# Patient Record
Sex: MALE | Race: WHITE | URBAN - METROPOLITAN AREA
[De-identification: names, ages, dates, MRNs, and addresses within clinical notes are randomized per-mention and may not be internally consistent; named-entity substitution may affect disease eponyms.]

---

## 2017-05-06 ENCOUNTER — HOSPITAL ENCOUNTER (OUTPATIENT)
Dept: ULTRASOUND IMAGING | Age: 37
Discharge: HOME | End: 2017-05-06
Attending: FAMILY MEDICINE | Admitting: FAMILY MEDICINE

## 2017-05-06 DIAGNOSIS — R19.03 RIGHT LOWER QUADRANT ABDOMINAL SWELLING, MASS AND LUMP: ICD-10-CM

## 2017-05-06 DIAGNOSIS — R19.03 ABDOMINAL MASS, RLQ (RIGHT LOWER QUADRANT): ICD-10-CM

## 2017-05-16 ENCOUNTER — HOSPITAL ENCOUNTER (OUTPATIENT)
Dept: CT IMAGING | Age: 37
Discharge: HOME | End: 2017-05-16
Attending: FAMILY MEDICINE | Admitting: FAMILY MEDICINE

## 2017-05-16 DIAGNOSIS — R19.03 ABDOMINAL MASS, RIGHT LOWER QUADRANT: ICD-10-CM

## 2017-05-16 DIAGNOSIS — R19.03 RIGHT LOWER QUADRANT ABDOMINAL SWELLING, MASS AND LUMP: ICD-10-CM

## 2017-05-31 ENCOUNTER — INITIAL CONSULT (OUTPATIENT)
Dept: SURGERY | Age: 37
End: 2017-05-31

## 2017-05-31 VITALS
WEIGHT: 159 LBS | HEIGHT: 70 IN | BODY MASS INDEX: 22.76 KG/M2 | DIASTOLIC BLOOD PRESSURE: 70 MMHG | SYSTOLIC BLOOD PRESSURE: 110 MMHG

## 2017-05-31 DIAGNOSIS — M79.89 SOFT TISSUE MASS: Primary | ICD-10-CM

## 2017-05-31 PROCEDURE — 99241 PR OFFICE CONSULTATION NEW/ESTAB PATIENT 15 MIN: CPT | Performed by: SURGERY

## 2017-06-07 ENCOUNTER — HOSPITAL ENCOUNTER (OUTPATIENT)
Dept: SURGERY | Age: 37
Discharge: HOME | End: 2017-06-07
Attending: SURGERY | Admitting: SURGERY

## 2017-06-07 VITALS
HEART RATE: 85 BPM | SYSTOLIC BLOOD PRESSURE: 105 MMHG | DIASTOLIC BLOOD PRESSURE: 86 MMHG | TEMPERATURE: 98 F | WEIGHT: 159 LBS | HEIGHT: 71 IN | OXYGEN SATURATION: 98 % | RESPIRATION RATE: 16 BRPM | BODY MASS INDEX: 22.26 KG/M2

## 2017-06-07 PROCEDURE — 22903 EXC ABD LES SC 3 CM/>: CPT | Performed by: SURGERY

## 2017-06-07 RX ORDER — SODIUM CHLORIDE, SODIUM LACTATE, POTASSIUM CHLORIDE, CALCIUM CHLORIDE 600; 310; 30; 20 MG/100ML; MG/100ML; MG/100ML; MG/100ML
INJECTION, SOLUTION INTRAVENOUS CONTINUOUS
Status: DISCONTINUED | OUTPATIENT
Start: 2017-06-07 | End: 2017-06-08 | Stop reason: HOSPADM

## 2017-06-07 RX ORDER — OXYCODONE HYDROCHLORIDE AND ACETAMINOPHEN 5; 325 MG/1; MG/1
1 TABLET ORAL PRN
Status: ACTIVE | OUTPATIENT
Start: 2017-06-07 | End: 2017-06-07

## 2017-06-07 RX ORDER — LIDOCAINE HYDROCHLORIDE 10 MG/ML
1 INJECTION, SOLUTION EPIDURAL; INFILTRATION; INTRACAUDAL; PERINEURAL
Status: COMPLETED | OUTPATIENT
Start: 2017-06-07 | End: 2017-06-07

## 2017-06-07 RX ORDER — OXYCODONE HYDROCHLORIDE AND ACETAMINOPHEN 5; 325 MG/1; MG/1
2 TABLET ORAL PRN
Status: ACTIVE | OUTPATIENT
Start: 2017-06-07 | End: 2017-06-07

## 2017-06-07 RX ORDER — HYDROMORPHONE HCL 110MG/55ML
0.5 PATIENT CONTROLLED ANALGESIA SYRINGE INTRAVENOUS EVERY 5 MIN PRN
Status: DISCONTINUED | OUTPATIENT
Start: 2017-06-07 | End: 2017-06-08 | Stop reason: HOSPADM

## 2017-06-07 RX ORDER — OXYCODONE HYDROCHLORIDE 5 MG/1
TABLET ORAL
Qty: 10 TABLET | Refills: 0 | Status: SHIPPED | OUTPATIENT
Start: 2017-06-07 | End: 2017-10-16

## 2017-06-07 RX ORDER — LABETALOL HYDROCHLORIDE 5 MG/ML
5 INJECTION, SOLUTION INTRAVENOUS
Status: DISCONTINUED | OUTPATIENT
Start: 2017-06-07 | End: 2017-06-08 | Stop reason: HOSPADM

## 2017-06-07 RX ORDER — MEPERIDINE HYDROCHLORIDE 25 MG/ML
12.5 INJECTION INTRAMUSCULAR; INTRAVENOUS; SUBCUTANEOUS EVERY 5 MIN PRN
Status: DISCONTINUED | OUTPATIENT
Start: 2017-06-07 | End: 2017-06-08 | Stop reason: HOSPADM

## 2017-06-07 RX ORDER — HYDRALAZINE HYDROCHLORIDE 20 MG/ML
5 INJECTION INTRAMUSCULAR; INTRAVENOUS EVERY 30 MIN PRN
Status: DISCONTINUED | OUTPATIENT
Start: 2017-06-07 | End: 2017-06-08 | Stop reason: HOSPADM

## 2017-06-07 RX ORDER — HYDROMORPHONE HCL 110MG/55ML
0.5 PATIENT CONTROLLED ANALGESIA SYRINGE INTRAVENOUS EVERY 10 MIN PRN
Status: DISCONTINUED | OUTPATIENT
Start: 2017-06-07 | End: 2017-06-08 | Stop reason: HOSPADM

## 2017-06-07 RX ORDER — ONDANSETRON 2 MG/ML
4 INJECTION INTRAMUSCULAR; INTRAVENOUS EVERY 30 MIN PRN
Status: DISCONTINUED | OUTPATIENT
Start: 2017-06-07 | End: 2017-06-08 | Stop reason: HOSPADM

## 2017-06-07 RX ORDER — DIPHENHYDRAMINE HYDROCHLORIDE 50 MG/ML
6.25 INJECTION INTRAMUSCULAR; INTRAVENOUS
Status: ACTIVE | OUTPATIENT
Start: 2017-06-07 | End: 2017-06-07

## 2017-06-07 RX ADMIN — SODIUM CHLORIDE, SODIUM LACTATE, POTASSIUM CHLORIDE, CALCIUM CHLORIDE: 600; 310; 30; 20 INJECTION, SOLUTION INTRAVENOUS at 07:39

## 2017-06-07 RX ADMIN — LIDOCAINE HYDROCHLORIDE 0.2 ML: 10 INJECTION, SOLUTION EPIDURAL; INFILTRATION; INTRACAUDAL; PERINEURAL at 07:39

## 2017-06-07 ASSESSMENT — PAIN - FUNCTIONAL ASSESSMENT: PAIN_FUNCTIONAL_ASSESSMENT: 0-10

## 2017-06-07 ASSESSMENT — PAIN SCALES - GENERAL
PAINLEVEL_OUTOF10: 0
PAINLEVEL_OUTOF10: 0

## 2017-06-14 ENCOUNTER — TELEPHONE (OUTPATIENT)
Dept: SURGERY | Age: 37
End: 2017-06-14

## 2019-09-15 ENCOUNTER — HOSPITAL ENCOUNTER (EMERGENCY)
Age: 39
Discharge: HOME | End: 2019-09-15
Attending: EMERGENCY MEDICINE
Payer: COMMERCIAL

## 2019-09-15 ENCOUNTER — APPOINTMENT (OUTPATIENT)
Dept: GENERAL RADIOLOGY | Age: 39
End: 2019-09-15
Payer: COMMERCIAL

## 2019-09-15 ENCOUNTER — APPOINTMENT (OUTPATIENT)
Dept: CT IMAGING | Age: 39
End: 2019-09-15
Payer: COMMERCIAL

## 2019-09-15 VITALS
DIASTOLIC BLOOD PRESSURE: 85 MMHG | SYSTOLIC BLOOD PRESSURE: 124 MMHG | HEIGHT: 71 IN | RESPIRATION RATE: 18 BRPM | WEIGHT: 174 LBS | BODY MASS INDEX: 24.36 KG/M2 | HEART RATE: 96 BPM | TEMPERATURE: 98.3 F | OXYGEN SATURATION: 98 %

## 2019-09-15 DIAGNOSIS — T14.8XXA PUNCTURE WOUND: Primary | ICD-10-CM
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
## 2019-09-15 PROCEDURE — 2500000003 HC RX 250 WO HCPCS: Performed by: EMERGENCY MEDICINE

## 2019-09-15 PROCEDURE — 73700 CT LOWER EXTREMITY W/O DYE: CPT

## 2019-09-15 PROCEDURE — 96375 TX/PRO/DX INJ NEW DRUG ADDON: CPT

## 2019-09-15 PROCEDURE — 4500000024 HC ED LEVEL 4 PROCEDURE

## 2019-09-15 PROCEDURE — 73590 X-RAY EXAM OF LOWER LEG: CPT

## 2019-09-15 PROCEDURE — 6360000002 HC RX W HCPCS: Performed by: EMERGENCY MEDICINE

## 2019-09-15 PROCEDURE — 96374 THER/PROPH/DIAG INJ IV PUSH: CPT

## 2019-09-15 PROCEDURE — 99284 EMERGENCY DEPT VISIT MOD MDM: CPT

## 2019-09-15 PROCEDURE — 73552 X-RAY EXAM OF FEMUR 2/>: CPT

## 2019-09-15 RX ORDER — KETOROLAC TROMETHAMINE 30 MG/ML
30 INJECTION, SOLUTION INTRAMUSCULAR; INTRAVENOUS ONCE
Status: COMPLETED | OUTPATIENT
Start: 2019-09-15 | End: 2019-09-15

## 2019-09-15 RX ORDER — LIDOCAINE HYDROCHLORIDE AND EPINEPHRINE 10; 10 MG/ML; UG/ML
20 INJECTION, SOLUTION INFILTRATION; PERINEURAL ONCE
Status: COMPLETED | OUTPATIENT
Start: 2019-09-15 | End: 2019-09-15

## 2019-09-15 RX ADMIN — LIDOCAINE HYDROCHLORIDE AND EPINEPHRINE 20 ML: 10; 10 INJECTION, SOLUTION INFILTRATION; PERINEURAL at 20:37

## 2019-09-15 RX ADMIN — KETOROLAC TROMETHAMINE 30 MG: 30 INJECTION, SOLUTION INTRAMUSCULAR at 19:12

## 2019-09-15 RX ADMIN — Medication 2 G: at 19:24

## 2019-09-15 ASSESSMENT — PAIN SCALES - GENERAL
PAINLEVEL_OUTOF10: 8
PAINLEVEL_OUTOF10: 8
PAINLEVEL_OUTOF10: 6
PAINLEVEL_OUTOF10: 5

## 2019-09-15 ASSESSMENT — ENCOUNTER SYMPTOMS
BACK PAIN: 0
NAUSEA: 0
PHOTOPHOBIA: 0
WHEEZING: 0
SHORTNESS OF BREATH: 0
VOMITING: 0
DIARRHEA: 0
ABDOMINAL PAIN: 0
COUGH: 0
RHINORRHEA: 0

## 2019-09-15 ASSESSMENT — PAIN DESCRIPTION - ORIENTATION: ORIENTATION: RIGHT

## 2019-09-15 ASSESSMENT — PAIN DESCRIPTION - LOCATION: LOCATION: LEG

## 2019-09-15 ASSESSMENT — PAIN DESCRIPTION - PAIN TYPE: TYPE: ACUTE PAIN

## 2019-09-15 NOTE — ED PROVIDER NOTES
the superficial femoral artery and the popliteal artery are unremarkable appearance, with no surrounding gas or edema. Joint:  Evaluation the joint is limited with CT technique. No intra-articular gas identified. There is a physiologic amount of joint fluid. Articular surfaces are unremarkable. The suprapatella, pre femoral, and Hoffa's fat pads are unremarkable in appearance. Although not well evaluated with CT technique, the anterior and posterior cruciate ligaments are grossly unremarkable. The extensor mechanism is unremarkable. There appears to be a chronic medial collateral ligament injury proximally. Mediolateral support structures are otherwise grossly unremarkable. Multifocal areas of soft tissue swelling. Additional soft tissue gas identified laterally and posteriorly within the distal thigh. No clear evidence of intra-articular extension, with no intra-articular gas, and a physiologic amount of joint fluid. Note that CT is suboptimal in evaluating the soft tissue structures of the knee, and if there is strong concern for internal knee injury, further evaluation with MRI should be considered. Labs  No results found for this visit on 09/15/19. [employer] [facility]  Date/Time: 9/15/2019 8:55 PM  Performed by: Margot Smith MD  Authorized by: Margot Smith MD     Consent:     Consent obtained:  Verbal    Consent given by:  Patient    Risks discussed:  Infection, pain, need for additional repair and retained foreign body    Alternatives discussed:  No treatment  Anesthesia (see MAR for exact dosages):      Anesthesia method:  Local infiltration    Local anesthetic:  Lidocaine 1% WITH epi  Laceration details:     Location:  Leg    Leg location:  R upper leg    Length (cm):  2  Repair type:     Repair type:  Simple  Pre-procedure details:     Preparation:  Patient was prepped and draped in usual sterile fashion  Exploration:     Wound exploration: wound explored through full range of motion may be inappropriate. If there are any questions or concerns please feel free to contact the dictating provider for clarification.      MD Brian Velez MD  09/15/19 8121

## 2019-09-24 ENCOUNTER — OFFICE VISIT (OUTPATIENT)
Dept: ORTHOPEDIC SURGERY | Age: 39
End: 2019-09-24
Payer: COMMERCIAL

## 2019-09-24 VITALS — BODY MASS INDEX: 24.35 KG/M2 | HEIGHT: 71 IN | WEIGHT: 173.94 LBS

## 2019-09-24 DIAGNOSIS — S81.811A LACERATION OF MULTIPLE SITES OF RIGHT LOWER EXTREMITY, INITIAL ENCOUNTER: Primary | ICD-10-CM

## 2019-09-24 PROCEDURE — 99203 OFFICE O/P NEW LOW 30 MIN: CPT | Performed by: ORTHOPAEDIC SURGERY

## 2019-09-24 PROCEDURE — 4004F PT TOBACCO SCREEN RCVD TLK: CPT | Performed by: ORTHOPAEDIC SURGERY

## 2019-09-24 PROCEDURE — G8427 DOCREV CUR MEDS BY ELIG CLIN: HCPCS | Performed by: ORTHOPAEDIC SURGERY

## 2019-09-24 PROCEDURE — G8420 CALC BMI NORM PARAMETERS: HCPCS | Performed by: ORTHOPAEDIC SURGERY

## 2019-09-25 PROBLEM — S81.811A LACERATIONS OF MULTIPLE SITES OF RIGHT LEG: Status: ACTIVE | Noted: 2019-09-25

## 2019-10-01 ENCOUNTER — OFFICE VISIT (OUTPATIENT)
Dept: ORTHOPEDIC SURGERY | Age: 39
End: 2019-10-01
Payer: COMMERCIAL

## 2019-10-01 DIAGNOSIS — S81.811A LACERATION OF MULTIPLE SITES OF RIGHT LOWER EXTREMITY, INITIAL ENCOUNTER: Primary | ICD-10-CM

## 2019-10-01 PROCEDURE — G8484 FLU IMMUNIZE NO ADMIN: HCPCS | Performed by: ORTHOPAEDIC SURGERY

## 2019-10-01 PROCEDURE — 99213 OFFICE O/P EST LOW 20 MIN: CPT | Performed by: ORTHOPAEDIC SURGERY

## 2019-10-01 PROCEDURE — G8420 CALC BMI NORM PARAMETERS: HCPCS | Performed by: ORTHOPAEDIC SURGERY

## 2019-10-01 PROCEDURE — 4004F PT TOBACCO SCREEN RCVD TLK: CPT | Performed by: ORTHOPAEDIC SURGERY

## 2019-10-01 PROCEDURE — G8428 CUR MEDS NOT DOCUMENT: HCPCS | Performed by: ORTHOPAEDIC SURGERY

## 2023-04-30 ENCOUNTER — HOSPITAL ENCOUNTER (EMERGENCY)
Age: 43
Discharge: HOME | End: 2023-04-30
Attending: STUDENT IN AN ORGANIZED HEALTH CARE EDUCATION/TRAINING PROGRAM
Payer: COMMERCIAL

## 2023-04-30 ENCOUNTER — APPOINTMENT (OUTPATIENT)
Dept: GENERAL RADIOLOGY | Age: 43
End: 2023-04-30
Payer: COMMERCIAL

## 2023-04-30 VITALS
HEART RATE: 91 BPM | WEIGHT: 190 LBS | RESPIRATION RATE: 18 BRPM | DIASTOLIC BLOOD PRESSURE: 93 MMHG | SYSTOLIC BLOOD PRESSURE: 134 MMHG | BODY MASS INDEX: 26.6 KG/M2 | OXYGEN SATURATION: 100 % | HEIGHT: 71 IN | TEMPERATURE: 98.3 F

## 2023-04-30 DIAGNOSIS — M79.644 FINGER PAIN, RIGHT: Primary | ICD-10-CM

## 2023-04-30 PROCEDURE — 99283 EMERGENCY DEPT VISIT LOW MDM: CPT

## 2023-04-30 PROCEDURE — 73130 X-RAY EXAM OF HAND: CPT

## 2023-04-30 RX ORDER — IBUPROFEN 800 MG/1
800 TABLET ORAL 2 TIMES DAILY PRN
Qty: 60 TABLET | Refills: 0 | Status: SHIPPED | OUTPATIENT
Start: 2023-04-30

## 2023-04-30 ASSESSMENT — PAIN DESCRIPTION - DESCRIPTORS: DESCRIPTORS: ACHING

## 2023-04-30 ASSESSMENT — PAIN SCALES - GENERAL: PAINLEVEL_OUTOF10: 8

## 2023-04-30 ASSESSMENT — PAIN - FUNCTIONAL ASSESSMENT: PAIN_FUNCTIONAL_ASSESSMENT: 0-10

## 2023-04-30 ASSESSMENT — PAIN DESCRIPTION - LOCATION: LOCATION: HAND

## 2023-04-30 ASSESSMENT — PAIN DESCRIPTION - ORIENTATION: ORIENTATION: RIGHT

## 2024-05-13 ENCOUNTER — HOSPITAL ENCOUNTER (OUTPATIENT)
Dept: GENERAL RADIOLOGY | Age: 44
Discharge: HOME | End: 2024-05-13
Payer: COMMERCIAL

## 2024-05-13 ENCOUNTER — HOSPITAL ENCOUNTER (OUTPATIENT)
Age: 44
Discharge: HOME | End: 2024-05-13
Payer: COMMERCIAL

## 2024-05-13 DIAGNOSIS — M54.2 NECK PAIN: ICD-10-CM

## 2024-05-13 PROCEDURE — 72040 X-RAY EXAM NECK SPINE 2-3 VW: CPT

## 2024-05-13 PROCEDURE — 73030 X-RAY EXAM OF SHOULDER: CPT

## 2024-12-15 ENCOUNTER — HOSPITAL ENCOUNTER (EMERGENCY)
Age: 44
Discharge: HOME | End: 2024-12-15
Payer: COMMERCIAL

## 2024-12-15 VITALS
BODY MASS INDEX: 25.14 KG/M2 | HEIGHT: 70 IN | WEIGHT: 175.6 LBS | TEMPERATURE: 98.5 F | SYSTOLIC BLOOD PRESSURE: 116 MMHG | RESPIRATION RATE: 16 BRPM | DIASTOLIC BLOOD PRESSURE: 81 MMHG | HEART RATE: 92 BPM | OXYGEN SATURATION: 98 %

## 2024-12-15 DIAGNOSIS — S39.012A STRAIN OF LUMBAR REGION, INITIAL ENCOUNTER: Primary | ICD-10-CM

## 2024-12-15 PROCEDURE — 6360000002 HC RX W HCPCS

## 2024-12-15 PROCEDURE — 99284 EMERGENCY DEPT VISIT MOD MDM: CPT

## 2024-12-15 PROCEDURE — 6370000000 HC RX 637 (ALT 250 FOR IP)

## 2024-12-15 PROCEDURE — 96372 THER/PROPH/DIAG INJ SC/IM: CPT

## 2024-12-15 RX ORDER — METHOCARBAMOL 750 MG/1
750 TABLET, FILM COATED ORAL 4 TIMES DAILY
Qty: 40 TABLET | Refills: 0 | Status: SHIPPED | OUTPATIENT
Start: 2024-12-15 | End: 2024-12-25

## 2024-12-15 RX ORDER — PREDNISONE 50 MG/1
50 TABLET ORAL DAILY
Qty: 4 TABLET | Refills: 0 | Status: SHIPPED | OUTPATIENT
Start: 2024-12-15 | End: 2024-12-19

## 2024-12-15 RX ORDER — ORPHENADRINE CITRATE 30 MG/ML
60 INJECTION INTRAMUSCULAR; INTRAVENOUS ONCE
Status: COMPLETED | OUTPATIENT
Start: 2024-12-15 | End: 2024-12-15

## 2024-12-15 RX ORDER — LIDOCAINE 4 G/G
1 PATCH TOPICAL DAILY
Status: DISCONTINUED | OUTPATIENT
Start: 2024-12-15 | End: 2024-12-15 | Stop reason: HOSPADM

## 2024-12-15 RX ORDER — KETOROLAC TROMETHAMINE 30 MG/ML
30 INJECTION, SOLUTION INTRAMUSCULAR; INTRAVENOUS ONCE
Status: COMPLETED | OUTPATIENT
Start: 2024-12-15 | End: 2024-12-15

## 2024-12-15 RX ORDER — LIDOCAINE 4 G/G
1 PATCH TOPICAL DAILY
Qty: 10 PATCH | Refills: 0 | Status: SHIPPED | OUTPATIENT
Start: 2024-12-15 | End: 2024-12-25

## 2024-12-15 RX ORDER — NAPROXEN 500 MG/1
500 TABLET ORAL 2 TIMES DAILY PRN
Qty: 30 TABLET | Refills: 0 | Status: SHIPPED | OUTPATIENT
Start: 2024-12-15

## 2024-12-15 RX ORDER — PREDNISONE 20 MG/1
40 TABLET ORAL ONCE
Status: COMPLETED | OUTPATIENT
Start: 2024-12-15 | End: 2024-12-15

## 2024-12-15 RX ORDER — HYDROCODONE BITARTRATE AND ACETAMINOPHEN 5; 325 MG/1; MG/1
2 TABLET ORAL ONCE
Status: COMPLETED | OUTPATIENT
Start: 2024-12-15 | End: 2024-12-15

## 2024-12-15 RX ADMIN — PREDNISONE 40 MG: 20 TABLET ORAL at 16:14

## 2024-12-15 RX ADMIN — HYDROCODONE BITARTRATE AND ACETAMINOPHEN 2 TABLET: 5; 325 TABLET ORAL at 16:14

## 2024-12-15 RX ADMIN — ORPHENADRINE CITRATE 60 MG: 60 INJECTION INTRAMUSCULAR; INTRAVENOUS at 16:13

## 2024-12-15 RX ADMIN — KETOROLAC TROMETHAMINE 30 MG: 30 INJECTION INTRAMUSCULAR; INTRAVENOUS at 16:14

## 2024-12-15 ASSESSMENT — PAIN SCALES - GENERAL
PAINLEVEL_OUTOF10: 8
PAINLEVEL_OUTOF10: 10
PAINLEVEL_OUTOF10: 10

## 2024-12-15 ASSESSMENT — PAIN - FUNCTIONAL ASSESSMENT: PAIN_FUNCTIONAL_ASSESSMENT: 0-10

## 2024-12-15 NOTE — ED NOTES
Ok for d/c. Stable, ambulatory d/t pt declining wheelchair. Edu pt re:new home meds and f/u w/ spine clinic. Pt verbalized understanding. Pt left w/ father to drive and all personal belongings.

## 2024-12-16 ENCOUNTER — HOSPITAL ENCOUNTER (OUTPATIENT)
Dept: GENERAL RADIOLOGY | Age: 44
Discharge: HOME | End: 2024-12-16
Payer: COMMERCIAL

## 2024-12-16 ENCOUNTER — HOSPITAL ENCOUNTER (OUTPATIENT)
Age: 44
Discharge: HOME | End: 2024-12-16
Payer: COMMERCIAL

## 2024-12-16 DIAGNOSIS — M54.16 LUMBAR RADICULOPATHY: ICD-10-CM

## 2024-12-16 PROCEDURE — 72110 X-RAY EXAM L-2 SPINE 4/>VWS: CPT

## 2024-12-16 NOTE — ED PROVIDER NOTES
Until Wed 12/25/2024, For 10 days, Disp-10 patch, R-0, Normal      methocarbamol (ROBAXIN-750) 750 MG tablet Take 1 tablet by mouth 4 times daily for 10 days, Disp-40 tablet, R-0Normal      naproxen (NAPROSYN) 500 MG tablet Take 1 tablet by mouth 2 times daily as needed for Pain, Disp-30 tablet, R-0Normal      predniSONE (DELTASONE) 50 MG tablet Take 1 tablet by mouth daily for 4 days, Disp-4 tablet, R-0Normal           Discontinued Medications:  Discharge Medication List as of 12/15/2024  4:59 PM        STOP taking these medications       ibuprofen (ADVIL;MOTRIN) 800 MG tablet Comments:   Reason for Stopping:             Risk management discussed and shared decision making had with patient and/or surrogate. All questions were answered. Patient will follow up with [facility] for further evaluation/treatment.  All questions answered.  Patient will return to ED for new/worsening symptoms.    DISPOSITION:  Patient was discharged.    (Please note that portions of this note were completed with a voice recognition program.  Efforts were made to edit the dictations but occasionally words are mis-transcribed).          Gaurav Delacruz PA-C  12/16/24 0051

## 2024-12-18 ENCOUNTER — HOSPITAL ENCOUNTER (OUTPATIENT)
Dept: PHYSICAL THERAPY | Age: 44
Setting detail: THERAPIES SERIES
Discharge: HOME | End: 2024-12-18
Payer: COMMERCIAL

## 2024-12-18 DIAGNOSIS — M79.605 LOW BACK PAIN RADIATING TO BOTH LEGS: Primary | ICD-10-CM

## 2024-12-18 DIAGNOSIS — M54.50 LOW BACK PAIN RADIATING TO BOTH LEGS: Primary | ICD-10-CM

## 2024-12-18 DIAGNOSIS — M79.604 LOW BACK PAIN RADIATING TO BOTH LEGS: Primary | ICD-10-CM

## 2024-12-18 PROCEDURE — 97110 THERAPEUTIC EXERCISES: CPT

## 2024-12-18 PROCEDURE — 97161 PT EVAL LOW COMPLEX 20 MIN: CPT

## 2024-12-18 NOTE — PLAN OF CARE
[facility]- Outpatient Rehabilitation and Therapy  [address] [place] [address] office: [phone] fax: [phone]      Physical Therapy Initial Evaluation Certification      Dear Svetlana Sheridan APRN -*,    We had the pleasure of evaluating the following patient for physical therapy services at [facility].  A summary of our findings can be found in the initial assessment below.  This includes our plan of care.  If you have any questions or concerns regarding these findings, please do not hesitate to contact me at the office phone number listed above.  Thank you for the referral.     Physician Signature:_______________________________Date:__________________  By signing above (or electronic signature), therapist’s plan is approved by physician       Physical Therapy: TREATMENT/PROGRESS NOTE   Patient: Sanjay Rodriguez (44 y.o. male)   Examination Date: 2024   :  1980 MRN: [id number]   Visit #:   Insurance Allowable Auth Needed   30/ yr [x]Yes    []No    Insurance: Payor: [employer] / Plan: [employer] [place] MEDICAID / Product Type: *No Product type* /   Insurance ID: [id number] - (Medicaid Managed)  Secondary Insurance (if applicable):    Treatment Diagnosis: M54.50, M79.604, M79.605 LBP radiating to both legs     Medical Diagnosis:  Lumbar radiculopathy [M54.16]   Referring Physician: Svetlana Sheridan APRN -*  PCP: Stephan Hernandez DO     Plan of care signed (Y/N): sent    Date of Patient follow up with Physician:      Plan of Care Report: EVAL today  POC update due: (10 visits /OR AUTH LIMITS, whichever is less)   2025                                             Medical History:  Comorbidities:  None  Relevant Medical History: chronic back pain                                         Precautions/ Contra-indications:           Latex allergy:  NO  Pacemaker:    NO  Contraindications for Manipulation: None  Date of Surgery:

## 2024-12-20 ENCOUNTER — HOSPITAL ENCOUNTER (OUTPATIENT)
Dept: PHYSICAL THERAPY | Age: 44
Setting detail: THERAPIES SERIES
Discharge: HOME | End: 2024-12-20
Payer: COMMERCIAL

## 2024-12-20 PROCEDURE — 97140 MANUAL THERAPY 1/> REGIONS: CPT

## 2024-12-20 PROCEDURE — 97110 THERAPEUTIC EXERCISES: CPT

## 2024-12-20 PROCEDURE — 97530 THERAPEUTIC ACTIVITIES: CPT

## 2024-12-20 NOTE — FLOWSHEET NOTE
[facility]- Outpatient Rehabilitation and Therapy  [address] [place] [address] office: [phone] fax: [phone]          Physical Therapy: TREATMENT/PROGRESS NOTE   Patient: Sanjay Rodriguez (44 y.o. male)   Examination Date: 2024   :  1980 MRN: [id number]   Visit #:   Insurance Allowable Auth Needed   30/ yr [x]Yes    []No    Insurance: Payor: [employer] / Plan: [employer] [place] MEDICAID / Product Type: *No Product type* /   Insurance ID: [id number] - (Medicaid Managed)  Secondary Insurance (if applicable):    Treatment Diagnosis: M54.50, M79.604, M79.605 LBP radiating to both legs     Medical Diagnosis:  Lumbar radiculopathy [M54.16]   Referring Physician: Svetlana Sheridan APRN -*  PCP: Stephan Hernandez DO     Plan of care signed (Y/N): sent    Date of Patient follow up with Physician:      Plan of Care Report: NO  POC update due: (10 visits /OR AUTH LIMITS, whichever is less)   2025                                             Medical History:  Comorbidities:  None  Relevant Medical History: chronic back pain                                         Precautions/ Contra-indications:           Latex allergy:  NO  Pacemaker:    NO  Contraindications for Manipulation: None  Date of Surgery:    Other:    Red Flags:  None    Suicide Screening:   The patient did not verbalize a primary behavioral concern, suicidal ideation, suicidal intent, or demonstrate suicidal behaviors.    Preferred Language for Healthcare:   [x] English       [] other:    SUBJECTIVE EXAMINATION     Patient stated complaint: compliant with HEP.  Needs to walk slowly and transition between movements slowly.    Eval:  Pt hurt his back Saturday, insidious onset.  10/10 pain began when he tried to get out of a truck.  This came after a couple days of packing and unpacking bags for a trip.  Has a Hx of several episodes of LBP, with the most recent episode occurring a couple weeks ago and resolved after

## 2024-12-23 ENCOUNTER — HOSPITAL ENCOUNTER (OUTPATIENT)
Dept: PHYSICAL THERAPY | Age: 44
Setting detail: THERAPIES SERIES
Discharge: HOME | End: 2024-12-23
Payer: COMMERCIAL

## 2024-12-23 PROCEDURE — 97140 MANUAL THERAPY 1/> REGIONS: CPT

## 2024-12-23 PROCEDURE — 97110 THERAPEUTIC EXERCISES: CPT

## 2024-12-23 NOTE — FLOWSHEET NOTE
[facility]- Outpatient Rehabilitation and Therapy  [address] [place] [address] office: [phone] fax: [phone]          Physical Therapy: TREATMENT/PROGRESS NOTE   Patient: Sanjay Rodriguez (44 y.o. male)   Examination Date: 2024   :  1980 MRN: [id number]   Visit #: 3 /8  Insurance Allowable Auth Needed   30/ yr [x]Yes    []No    Insurance: Payor: [employer] / Plan: [employer] [place] MEDICAID / Product Type: *No Product type* /   Insurance ID: [id number] - (Medicaid Managed)  Secondary Insurance (if applicable):    Treatment Diagnosis: M54.50, M79.604, M79.605 LBP radiating to both legs     Medical Diagnosis:  Lumbar radiculopathy [M54.16]   Referring Physician: Svetlana Sheridan APRN -*  PCP: Stephan Hernandez DO     Plan of care signed (Y/N): sent    Date of Patient follow up with Physician:      Plan of Care Report: NO  POC update due: (10 visits /OR AUTH LIMITS, whichever is less)   2025                                             Medical History:  Comorbidities:  None  Relevant Medical History: chronic back pain                                         Precautions/ Contra-indications:           Latex allergy:  NO  Pacemaker:    NO  Contraindications for Manipulation: None  Date of Surgery:    Other:    Red Flags:  None    Suicide Screening:   The patient did not verbalize a primary behavioral concern, suicidal ideation, suicidal intent, or demonstrate suicidal behaviors.    Preferred Language for Healthcare:   [x] English       [] other:    SUBJECTIVE EXAMINATION     Patient stated complaint: RTW today.  Having pain with walking long distances and with return to stand from forward flexion.  Does well if he moves slowly.  No c/o from LV.    Eval:  Pt hurt his back Saturday, insidious onset.  10/10 pain began when he tried to get out of a truck.  This came after a couple days of packing and unpacking bags for a trip.  Has a Hx of several episodes of LBP, with the

## 2024-12-26 ENCOUNTER — APPOINTMENT (OUTPATIENT)
Dept: PHYSICAL THERAPY | Age: 44
End: 2024-12-26
Payer: COMMERCIAL

## 2024-12-30 ENCOUNTER — HOSPITAL ENCOUNTER (OUTPATIENT)
Dept: PHYSICAL THERAPY | Age: 44
Setting detail: THERAPIES SERIES
Discharge: HOME | End: 2024-12-30
Payer: COMMERCIAL

## 2024-12-30 PROCEDURE — 97110 THERAPEUTIC EXERCISES: CPT

## 2024-12-30 PROCEDURE — 97140 MANUAL THERAPY 1/> REGIONS: CPT

## 2024-12-30 NOTE — FLOWSHEET NOTE
Physical Therapist to address and improve pain control, neuromuscular control, functional mobility, proper body mechanics, and tolerance to work activity to safely return to OF without symptoms or restrictions.    Medical Necessity Documentation:  I certify that this patient meets the below criteria necessary for medical necessity for care and/or justification of therapy services:  The patient has functional impairments and/or activity limitations and would benefit from continued outpatient therapy services to address the deficits outlined in the patients goals    Return to Play: NA    Prognosis for POC: [x] Good [] Fair  [] Poor    Patient requires continued skilled intervention: [x] Yes  [] No      CHARGE CAPTURE     PT CHARGE GRID   CPT Code (TIMED) minutes # CPT Code (UNTIMED) #     Therex (57242)  23   2  EVAL:LOW (53438 - Typically 20 minutes face-to-face)      Neuromusc. Re-ed (32337)    Re-Eval (38760)     Manual (88083) 17 1  Estim Unattended (95997)     Ther. Act (46896)      Mech. Traction (84585)     Gait (68130)    Dry Needle 1-2 muscle (08828)     Aquatic Therex (50861)    Dry Needle 3+ muscle (76123)     Iontophoresis (13212)    VASO (46897)     Ultrasound (49778)    Group Therapy (34422)     Estim Attended (39392)    Canalith Repositioning (45467)     Physical Performance Test (79033)    Custom orthotic ()     Other:    Other:    Total Timed Code Tx Minutes  40  3        Total Treatment Minutes 47        Charge Justification:    (84884) THERAPEUTIC EXERCISE - Provided verbal/tactile cueing for activities related to strengthening, flexibility, endurance, ROM performed to prevent loss of range of motion, maintain or improve muscular strength or increase flexibility, following either an injury or surgery.   (07118) MANUAL THERAPY -  Manual therapy techniques, 1 or more regions, each 15 minutes (Mobilization/manipulation, manual lymphatic drainage, manual traction) for the purpose of modulating

## 2025-01-02 ENCOUNTER — HOSPITAL ENCOUNTER (OUTPATIENT)
Dept: PHYSICAL THERAPY | Age: 45
Setting detail: THERAPIES SERIES
Discharge: HOME | End: 2025-01-02
Payer: COMMERCIAL

## 2025-01-02 PROCEDURE — 97110 THERAPEUTIC EXERCISES: CPT

## 2025-01-02 PROCEDURE — 97140 MANUAL THERAPY 1/> REGIONS: CPT

## 2025-01-02 NOTE — FLOWSHEET NOTE
[facility]- Outpatient Rehabilitation and Therapy  [address] [place] [address] office: [phone] fax: [phone]          Physical Therapy: TREATMENT/PROGRESS NOTE   Patient: Sanjay Rodriguez (44 y.o. male)   Examination Date: 2025   :  1980 MRN: [id number]   Visit #:   Insurance Allowable Auth Needed   30/ yr [x]Yes    []No    Insurance: Payor: [employer] / Plan: [employer] [place] MEDICAID / Product Type: *No Product type* /   Insurance ID: [id number] - (Medicaid Managed)  Secondary Insurance (if applicable):    Treatment Diagnosis: M54.50, M79.604, M79.605 LBP radiating to both legs     Medical Diagnosis:  Lumbar radiculopathy [M54.16]   Referring Physician: Svetlana Sheridan APRN -*  PCP: Stephan Hernandez DO     Plan of care signed (Y/N): yes    Date of Patient follow up with Physician:      Plan of Care Report: NO  POC update due: (10 visits /OR AUTH LIMITS, whichever is less)   2025                                             Medical History:  Comorbidities:  None  Relevant Medical History: chronic back pain                                         Precautions/ Contra-indications:           Latex allergy:  NO  Pacemaker:    NO  Contraindications for Manipulation: None  Date of Surgery:    Other:    Red Flags:  None    Suicide Screening:   The patient did not verbalize a primary behavioral concern, suicidal ideation, suicidal intent, or demonstrate suicidal behaviors.    Preferred Language for Healthcare:   [x] English       [] other:    SUBJECTIVE EXAMINATION     Patient stated complaint: cont to have pain/ difficulty with forward flexion    Eval:  Pt hurt his back Saturday, insidious onset.  10/10 pain began when he tried to get out of a truck.  This came after a couple days of packing and unpacking bags for a trip.  Has a Hx of several episodes of LBP, with the most recent episode occurring a couple weeks ago and resolved after a day.  States this is the

## 2025-01-03 ENCOUNTER — APPOINTMENT (OUTPATIENT)
Dept: PHYSICAL THERAPY | Age: 45
End: 2025-01-03
Payer: COMMERCIAL

## 2025-01-06 ENCOUNTER — HOSPITAL ENCOUNTER (OUTPATIENT)
Dept: PHYSICAL THERAPY | Age: 45
Setting detail: THERAPIES SERIES
End: 2025-01-06
Payer: COMMERCIAL

## 2025-01-09 ENCOUNTER — HOSPITAL ENCOUNTER (OUTPATIENT)
Dept: PHYSICAL THERAPY | Age: 45
Setting detail: THERAPIES SERIES
Discharge: HOME | End: 2025-01-09
Payer: COMMERCIAL

## 2025-01-09 PROCEDURE — 97140 MANUAL THERAPY 1/> REGIONS: CPT

## 2025-01-09 PROCEDURE — 97110 THERAPEUTIC EXERCISES: CPT

## 2025-01-09 NOTE — FLOWSHEET NOTE
[facility]- Outpatient Rehabilitation and Therapy  [address] [place] [address] office: [phone] fax: [phone]          Physical Therapy: TREATMENT/PROGRESS NOTE   Patient: Sanjay Rodriguez (44 y.o. male)   Examination Date: 2025   :  1980 MRN: [id number]   Visit #:   Insurance Allowable Auth Needed   30/ yr [x]Yes    []No    Insurance: Payor: [employer] / Plan: [employer] [place] MEDICAID / Product Type: *No Product type* /   Insurance ID: [id number] - (Medicaid Managed)  Secondary Insurance (if applicable):    Treatment Diagnosis: M54.50, M79.604, M79.605 LBP radiating to both legs     Medical Diagnosis:  Lumbar radiculopathy [M54.16]   Referring Physician: Svetlana Sheridan APRN -*  PCP: Stephan Hernandez DO     Plan of care signed (Y/N): yes    Date of Patient follow up with Physician:      Plan of Care Report: NO  POC update due: (10 visits /OR AUTH LIMITS, whichever is less)   2025                                             Medical History:  Comorbidities:  None  Relevant Medical History: chronic back pain                                         Precautions/ Contra-indications:           Latex allergy:  NO  Pacemaker:    NO  Contraindications for Manipulation: None  Date of Surgery:    Other:    Red Flags:  None    Suicide Screening:   The patient did not verbalize a primary behavioral concern, suicidal ideation, suicidal intent, or demonstrate suicidal behaviors.    Preferred Language for Healthcare:   [x] English       [] other:    SUBJECTIVE EXAMINATION     Patient stated complaint: states his back was \"horrible\" yesterday, a little better today    Eval:  Pt hurt his back Saturday, insidious onset.  10/10 pain began when he tried to get out of a truck.  This came after a couple days of packing and unpacking bags for a trip.  Has a Hx of several episodes of LBP, with the most recent episode occurring a couple weeks ago and resolved after a day.  States

## 2025-01-13 ENCOUNTER — HOSPITAL ENCOUNTER (OUTPATIENT)
Dept: PHYSICAL THERAPY | Age: 45
Setting detail: THERAPIES SERIES
Discharge: HOME | End: 2025-01-13
Payer: COMMERCIAL

## 2025-01-13 PROCEDURE — 97110 THERAPEUTIC EXERCISES: CPT

## 2025-01-13 PROCEDURE — 97140 MANUAL THERAPY 1/> REGIONS: CPT

## 2025-01-13 NOTE — FLOWSHEET NOTE
[facility]- Outpatient Rehabilitation and Therapy  [address] [place] [address] office: [phone] fax: [phone]          Physical Therapy: TREATMENT/PROGRESS NOTE   Patient: Sanjay Rodriguez (44 y.o. male)   Examination Date: 2025   :  1980 MRN: [id number]   Visit #:   Insurance Allowable Auth Needed   30/ yr [x]Yes    []No    Insurance: Payor: [employer] / Plan: [employer] [place] MEDICAID / Product Type: *No Product type* /   Insurance ID: [id number] - (Medicaid Managed)  Secondary Insurance (if applicable):    Treatment Diagnosis: M54.50, M79.604, M79.605 LBP radiating to both legs     Medical Diagnosis:  Lumbar radiculopathy [M54.16]   Referring Physician: Svetlana Sheridan APRN -*  PCP: Stephan Hernandez DO     Plan of care signed (Y/N): yes    Date of Patient follow up with Physician:      Plan of Care Report: NO  POC update due: (10 visits /OR AUTH LIMITS, whichever is less)   2025                                             Medical History:  Comorbidities:  None  Relevant Medical History: chronic back pain                                         Precautions/ Contra-indications:           Latex allergy:  NO  Pacemaker:    NO  Contraindications for Manipulation: None  Date of Surgery:    Other:    Red Flags:  None    Suicide Screening:   The patient did not verbalize a primary behavioral concern, suicidal ideation, suicidal intent, or demonstrate suicidal behaviors.    Preferred Language for Healthcare:   [x] English       [] other:    SUBJECTIVE EXAMINATION     Patient stated complaint: \"Not too bad. Little better today.\"    Eval:  Pt hurt his back Saturday, insidious onset.  10/10 pain began when he tried to get out of a truck.  This came after a couple days of packing and unpacking bags for a trip.  Has a Hx of several episodes of LBP, with the most recent episode occurring a couple weeks ago and resolved after a day.  States this is the worst episode.

## 2025-01-17 ENCOUNTER — HOSPITAL ENCOUNTER (OUTPATIENT)
Dept: PHYSICAL THERAPY | Age: 45
Setting detail: THERAPIES SERIES
Discharge: HOME | End: 2025-01-17
Payer: COMMERCIAL

## 2025-01-17 PROCEDURE — 97140 MANUAL THERAPY 1/> REGIONS: CPT

## 2025-01-17 PROCEDURE — 97110 THERAPEUTIC EXERCISES: CPT

## 2025-01-17 NOTE — FLOWSHEET NOTE
Patient is not progressing as expected and requires additional follow up with physician  [] Other:     TREATMENT PLAN     Frequency/Duration: 2x/week for 4-6 weeks for the following treatment interventions:    Interventions:  Therapeutic Exercise (94232) including: strength training, ROM, and functional mobility  Therapeutic Activities (20106) including: functional mobility training and education.  Neuromuscular Re-education (12651) activation and proprioception, including postural re-education.    Manual Therapy (98282) as indicated to include: Gr I-IV mobilizations, Grade V Manipulation, Soft Tissue Mobilization, and Trigger Point Release  Modalities as needed that may include: Thermal Agents  Patient education on joint protection, postural re-education, activity modification, and progression of HEP    Plan: Cont POC- Continue emphasis/focus on exercise progression, modulating pain, and increasing ROM. Next visit plan to continue current phase     Electronically Signed by Johnny Luther PT  Date: 01/17/2025     Note: Portions of this note have been templated and/or copied from initial evaluation, reassessments and prior notes for documentation efficiency.    Note: If patient does not return for scheduled/recommended follow up visits, this note will serve as a discharge from care along with the most recent update on progress.